# Patient Record
Sex: MALE | Race: WHITE | URBAN - METROPOLITAN AREA
[De-identification: names, ages, dates, MRNs, and addresses within clinical notes are randomized per-mention and may not be internally consistent; named-entity substitution may affect disease eponyms.]

---

## 2020-12-07 ENCOUNTER — OFFICE VISIT (OUTPATIENT)
Dept: PODIATRY | Age: 65
End: 2020-12-07
Payer: COMMERCIAL

## 2020-12-07 VITALS — HEIGHT: 71 IN | TEMPERATURE: 97.8 F | BODY MASS INDEX: 36.4 KG/M2 | WEIGHT: 260 LBS

## 2020-12-07 PROBLEM — M72.2 PLANTAR FASCIITIS: Status: ACTIVE | Noted: 2020-12-07

## 2020-12-07 PROBLEM — M79.672 PAIN IN LEFT FOOT: Status: ACTIVE | Noted: 2020-12-07

## 2020-12-07 PROCEDURE — 99203 OFFICE O/P NEW LOW 30 MIN: CPT | Performed by: PODIATRIST

## 2020-12-07 PROCEDURE — 20550 NJX 1 TENDON SHEATH/LIGAMENT: CPT | Performed by: PODIATRIST

## 2020-12-07 RX ORDER — METHYLPREDNISOLONE ACETATE 40 MG/ML
40 INJECTION, SUSPENSION INTRA-ARTICULAR; INTRALESIONAL; INTRAMUSCULAR; SOFT TISSUE ONCE
Status: COMPLETED | OUTPATIENT
Start: 2020-12-07 | End: 2020-12-07

## 2020-12-07 RX ADMIN — METHYLPREDNISOLONE ACETATE 40 MG: 40 INJECTION, SUSPENSION INTRA-ARTICULAR; INTRALESIONAL; INTRAMUSCULAR; SOFT TISSUE at 12:54

## 2020-12-07 NOTE — PROGRESS NOTES
Patient is here today for evaluation of lump on the left foot/ankle. He states it is painful and causes pain through his heel.

## 2020-12-07 NOTE — PROGRESS NOTES
20     Lakeisha Garsia    : 1955 Sex: male   Age: 60 y.o. Patient was referred by: Unique Holden MD  Patient's PCP/Provider is:  Unique Holden MD    Subjective:    Patient is seen today for evaluation regarding painful left heel. Chief Complaint   Patient presents with    Foot Pain       HPI: Patient stated the issues been going on over the last 4 to 5 weeks and has progressively gotten worse. He denies any history of injury or change in activities. He has not tried any OTC treatments to this point in time. No other additional abnormalities noted at this time. ROS:  Const: Positives and pertinent negatives as per HPI. Musculo: Denies symptoms other than stated above. Neuro: Denies symptoms other than stated above. Skin: Denies symptoms other than stated above. Current Medications:  No current outpatient medications on file. Allergies:  No Known Allergies    Vitals:    20 1151   Temp: 97.8 °F (36.6 °C)   Weight: 260 lb (117.9 kg)   Height: 5' 11\" (1.803 m)        No past medical history on file. No family history on file. No past surgical history on file. Social History     Tobacco Use    Smoking status: Not on file   Substance Use Topics    Alcohol use: Not on file    Drug use: Not on file           Diagnostic studies:    Previous x-ray studies were reviewed with patient in detail today. Procedures:    Plantar Fascitis Injection: It was discussed in detail with the patient the use of injections to help treat plantar fascitis. The patient was made aware of the possibility of a steroid flare, which is an increase in pain that presents itself a few hours after cortisone injection. This is a non allergic reaction. If this occurs, the patient was instructed to take anti-inflammatories and rest, ice and elevate the extremity. The pain will subside in 24 to 48 hours.   Potential risks, complications, alternative treatments ad procedure prognosis were explained to the patient. Verbal informed consent was obtained from the patient. Antiseptic preparation of the skin of the left heel was performed. Initially, Ethyl Chloride spray was utilized. Then a total of 3 cc's of equal mixture of 1% lidocaine plain, and methylprednisolone 40 were administered into the symptomatic heel in efforts to decrease pain, swelling, and inflammation. Patient tolerated the injection well. Exam:  VASCULAR: Pedal pulses palpable left foot. Capillary fill time brisk digits 1 through 5 left foot. NEUROLOGICAL: Epicritic sensations intact left foot. No paresthesias noted upon percussion tarsal tunnel region left foot. DERMATOLOGICAL: No edema or ecchymotic skin changes present left heel and arch region. No plantar calluses or heel fissuring noted left foot. MUSCULOSKELETAL: Tenderness noted to palpation to the plantar medial left heel region. Adequate range of motion noted left ankle and subtalar joint. Plan Per Assessment  Gustavo OHARA was seen today for foot pain. Diagnoses and all orders for this visit:    Plantar fasciitis  -     methylPREDNISolone acetate (DEPO-MEDROL) injection 40 mg    Pain in left foot  -     methylPREDNISolone acetate (DEPO-MEDROL) injection 40 mg    Difficulty walking        1. New patient evaluation and management  2. We did review x-ray studies with patient today. Patient did receive a cortisone injection into the symptomatic left heel as described above. Patient tolerated the injection without issues. 3. Patient was advised on daily stretching and strengthening exercises. We did advise appropriate shoe gear to be worn to give him added support into the heel and arch regions as well. 4. Patient will be followed up in 1 week's time for continued evaluation and management. Seen By:    Dorice Boas, DPM    Electronically signed by Dorice Boas, DPM on 12/7/2020 at 12:55 PM      This note was created using voice recognition software.   The note was reviewed however may contain grammatical errors.

## 2020-12-07 NOTE — LETTER
[facility] [address] [place] [address]    Phone: [phone]  Fax: Alicia Dugan  <[id number]>   1955 12/7/2020      Dear Charmaine Medina,    I would like to thank you for the kind referral of Agnes Flanagan. He presented to the office today for evaluation regarding pain and swelling into his left heel and arch region. We did review x-ray studies with patient today. Patient did receive a cortisone injection into the symptomatic left heel. Patient was advised on daily stretching and strengthening exercises and shoe gear recommendations as well. We will have continued follow-up until issues are resolved. If you should have any questions concerning his visit today, please do not hesitate to contact me.     Sincerely,    Reema Mejia DPM

## 2020-12-14 ENCOUNTER — OFFICE VISIT (OUTPATIENT)
Dept: PODIATRY | Age: 65
End: 2020-12-14
Payer: COMMERCIAL

## 2020-12-14 VITALS — TEMPERATURE: 96.9 F

## 2020-12-14 PROCEDURE — 99213 OFFICE O/P EST LOW 20 MIN: CPT | Performed by: PODIATRIST

## 2020-12-14 NOTE — PROGRESS NOTES
Patient is here today for follow up evaluation of planter fascitis right foot. Patient states he is no longer feeling discomfort.

## 2020-12-14 NOTE — PROGRESS NOTES
20     Rona Lopez    : 1955   Sex: male    Age: 60 y.o. Patient's PCP/Provider is:  Jeremy Villeda MD    Subjective:  Patient is seen today for follow-up regarding continued care plantar fasciitis left foot. Overall patient is about 85% improved from the initial cortisone injection. He denies any nausea, vomiting, fever, chills. He has been wearing his good supportive shoe gear with all ambulatory activities with noted improvement. He denies any additional issues at this time. Chief Complaint   Patient presents with    Foot Pain       ROS:  Const: Positives and pertinent negatives as per HPI. Musculo: Denies symptoms other than stated above. Neuro: Denies symptoms other than stated above. Skin: Denies symptoms other than stated above. Current Medications:  No current outpatient medications on file. Allergies:  No Known Allergies    Vitals:    20 1359   Temp: 96.9 °F (36.1 °C)       Exam:  VASCULAR: Pedal pulses palpable left foot. Capillary fill time brisk digits 1 through 5 left foot. NEUROLOGICAL: Epicritic sensations intact left foot. No paresthesias noted upon percussion tarsal tunnel region left foot. DERMATOLOGICAL: No edema or ecchymotic skin changes present plantar left heel and arch region. MUSCULOSKELETAL: Minimal tenderness noted to the plantar left heel to palpation. Adequate range of motion left ankle and subtalar joint. Diagnostic Studies:     No results found. Procedures:    None    Plan Per Assessment  Nathan OHARA was seen today for foot pain. Diagnoses and all orders for this visit:    Plantar fasciitis  -     Amb External Referral For Orthotics    Difficulty walking      1. Evaluation and management  2. We did discuss continued treatment options with patient in detail today. 3. We did recommend purchasing OTC insoles which patient was given information on getting. We did recommend good supportive shoe gear and continued daily stretching and strengthening exercises for continued symptom improvement. 4. Patient will be followed up at a later date if any further Podiatric issues arise. Seen By:    John De La Torre DPM    Electronically signed by John De La Torre DPM on 12/14/2020 at 2:30 PM    This note was created using voice recognition software. The note was reviewed however may contain grammatical errors.